# Patient Record
Sex: FEMALE | URBAN - METROPOLITAN AREA
[De-identification: names, ages, dates, MRNs, and addresses within clinical notes are randomized per-mention and may not be internally consistent; named-entity substitution may affect disease eponyms.]

---

## 2018-03-23 ENCOUNTER — APPOINTMENT (RX ONLY)
Dept: URBAN - METROPOLITAN AREA CLINIC 15 | Facility: CLINIC | Age: 38
Setting detail: DERMATOLOGY
End: 2018-03-23

## 2018-03-23 ENCOUNTER — RX ONLY (OUTPATIENT)
Age: 38
Setting detail: RX ONLY
End: 2018-03-23

## 2018-03-23 DIAGNOSIS — Z41.9 ENCOUNTER FOR PROCEDURE FOR PURPOSES OTHER THAN REMEDYING HEALTH STATE, UNSPECIFIED: ICD-10-CM

## 2018-03-23 DIAGNOSIS — L85.3 XEROSIS CUTIS: ICD-10-CM

## 2018-03-23 DIAGNOSIS — L81.0 POSTINFLAMMATORY HYPERPIGMENTATION: ICD-10-CM

## 2018-03-23 DIAGNOSIS — L81.4 OTHER MELANIN HYPERPIGMENTATION: ICD-10-CM

## 2018-03-23 DIAGNOSIS — L30.1 DYSHIDROSIS [POMPHOLYX]: ICD-10-CM

## 2018-03-23 PROBLEM — E05.90 THYROTOXICOSIS, UNSPECIFIED WITHOUT THYROTOXIC CRISIS OR STORM: Status: ACTIVE | Noted: 2018-03-23

## 2018-03-23 PROCEDURE — ? TREATMENT REGIMEN

## 2018-03-23 PROCEDURE — ? MEDICAL CONSULTATION: COOLSCULPTING

## 2018-03-23 PROCEDURE — ? COUNSELING

## 2018-03-23 PROCEDURE — ? ADDITIONAL NOTES

## 2018-03-23 PROCEDURE — 99203 OFFICE O/P NEW LOW 30 MIN: CPT

## 2018-03-23 PROCEDURE — ? PATIENT SPECIFIC COUNSELING

## 2018-03-23 RX ORDER — BETAMETHASONE VALERATE 1 MG/G
OINTMENT TOPICAL
Qty: 1 | Refills: 1 | Status: ERX | COMMUNITY
Start: 2018-03-23

## 2018-03-23 ASSESSMENT — LOCATION ZONE DERM
LOCATION ZONE: FINGER
LOCATION ZONE: NECK
LOCATION ZONE: TRUNK
LOCATION ZONE: ARM

## 2018-03-23 ASSESSMENT — LOCATION SIMPLE DESCRIPTION DERM
LOCATION SIMPLE: RIGHT FOREARM
LOCATION SIMPLE: ABDOMEN
LOCATION SIMPLE: RIGHT ANTERIOR NECK
LOCATION SIMPLE: LEFT WRIST
LOCATION SIMPLE: LEFT MIDDLE FINGER
LOCATION SIMPLE: LEFT RING FINGER
LOCATION SIMPLE: LEFT FOREARM
LOCATION SIMPLE: LEFT ANTERIOR NECK
LOCATION SIMPLE: RIGHT SMALL FINGER
LOCATION SIMPLE: LEFT INDEX FINGER

## 2018-03-23 ASSESSMENT — LOCATION DETAILED DESCRIPTION DERM
LOCATION DETAILED: LEFT RIB CAGE
LOCATION DETAILED: RIGHT DISTAL DORSAL FOREARM
LOCATION DETAILED: LEFT MIDDLE FINGERTIP
LOCATION DETAILED: LEFT INFERIOR ANTERIOR NECK
LOCATION DETAILED: RIGHT PROXIMAL DORSAL FOREARM
LOCATION DETAILED: LEFT PROXIMAL DORSAL FOREARM
LOCATION DETAILED: RIGHT SMALL FINGERTIP
LOCATION DETAILED: LEFT DORSAL WRIST
LOCATION DETAILED: SUBXIPHOID
LOCATION DETAILED: LEFT RING FINGERTIP
LOCATION DETAILED: LEFT INDEX FINGERTIP
LOCATION DETAILED: RIGHT INFERIOR LATERAL NECK

## 2018-03-23 ASSESSMENT — SEVERITY ASSESSMENT: SEVERITY: MILD

## 2018-03-23 NOTE — HPI: DRY SKIN
How Severe Is Your Dry Skin?: mild
Additional History: Patient has tried tree tea oil for the condition and she states that it helps a little.

## 2018-03-23 NOTE — PROCEDURE: PATIENT SPECIFIC COUNSELING
Discussed with patient of all the possible trigger factors with the patient such as stress induced and genetics. Will start patient on a treatment plan. Also, explained to patient to apply moisturizer on a daily basis to prevent the flare.
Detail Level: Simple
Detail Level: Zone
Secondary to a rash she had. Will start patient on a topical treatment plan. Also, discussed with patient the importance use of sunscreen to prevent the condition to not get worse.

## 2018-03-23 NOTE — HPI: COSMETIC CONSULTATION
When Outside In The Sun, Do You...: mostly tans, rarely burns
Additional History: She would like to discuss treatment options.

## 2018-03-23 NOTE — HPI: COSMETIC (COOLSCULPTING)
Have You Had Previous Coolsculpting Treatments Before?: has not had previous treatments
Additional History: The patient has accumulated too much fat on her upper and lower abdomen after her pregnancy 1 year ago.

## 2018-03-23 NOTE — PROCEDURE: TREATMENT REGIMEN
Detail Level: Zone
Initiate Treatment: Betamethasone 0.1% Ointment Apply to affected areas on hands twice daily for 15 days then once daily for 15 days \\nAquaphor healing ointment Apply to hands twice daily (samples of CeraVe healing ointment were given)
Initiate Treatment: SPF daily that has the zinc oxide and titanium dioxide
Initiate Treatment: CeraVe Cream once daily after showers
Initiate Treatment: Betamethasone 0.1% Ointment Apply to affected areas underneath the breasts twice daily for 15 days then once daily for 15 days\\nSPF daily that has the zinc oxide and titanium dioxide
Plan: Advanced skin brightening 6% Cream